# Patient Record
Sex: FEMALE | Race: WHITE
[De-identification: names, ages, dates, MRNs, and addresses within clinical notes are randomized per-mention and may not be internally consistent; named-entity substitution may affect disease eponyms.]

---

## 2020-03-30 ENCOUNTER — HOSPITAL ENCOUNTER (EMERGENCY)
Dept: HOSPITAL 12 - ER | Age: 30
Discharge: HOME | End: 2020-03-30
Payer: SELF-PAY

## 2020-03-30 VITALS — DIASTOLIC BLOOD PRESSURE: 74 MMHG | SYSTOLIC BLOOD PRESSURE: 122 MMHG

## 2020-03-30 VITALS — WEIGHT: 100 LBS | BODY MASS INDEX: 20.16 KG/M2 | HEIGHT: 59 IN

## 2020-03-30 DIAGNOSIS — T81.31XA: Primary | ICD-10-CM

## 2020-03-30 PROCEDURE — A4663 DIALYSIS BLOOD PRESSURE CUFF: HCPCS

## 2020-03-30 PROCEDURE — 0HQ8XZZ REPAIR BUTTOCK SKIN, EXTERNAL APPROACH: ICD-10-PCS | Performed by: EMERGENCY MEDICINE

## 2020-03-30 NOTE — NUR
Pt walked in ER, pt requesting wound check of coccyx incesion site, for excess 
drainage and possible infection.

Female chaperone accompanied female patient for (Dr Rush).